# Patient Record
Sex: FEMALE | Race: WHITE | NOT HISPANIC OR LATINO | Employment: OTHER | URBAN - METROPOLITAN AREA
[De-identification: names, ages, dates, MRNs, and addresses within clinical notes are randomized per-mention and may not be internally consistent; named-entity substitution may affect disease eponyms.]

---

## 2017-10-23 ENCOUNTER — TELEPHONE (OUTPATIENT)
Dept: OPHTHALMOLOGY | Facility: CLINIC | Age: 72
End: 2017-10-23

## 2017-10-23 NOTE — TELEPHONE ENCOUNTER
----- Message from Dana Daugherty sent at 10/20/2017  2:30 PM CDT -----  Contact: Madie Hebert   Pt would like speak with  nurse to scheduled the appointment pt can be reached at [phone] or cell# [phone] please thank you.      10/23/17 at 8:14a  [place] returned call to pt regarding scheduling an appt with Dr. Calvin MD for eval of Grave's disease, diplopia and strabismus. She will call back to schedule due to other personal business she has to take care of first. melvina

## 2018-01-16 ENCOUNTER — OFFICE VISIT (OUTPATIENT)
Dept: OPHTHALMOLOGY | Facility: CLINIC | Age: 73
End: 2018-01-16
Payer: MEDICARE

## 2018-01-16 DIAGNOSIS — H50.21 HYPERTROPIA OF RIGHT EYE: ICD-10-CM

## 2018-01-16 DIAGNOSIS — E05.00 GRAVES' DISEASE: Primary | ICD-10-CM

## 2018-01-16 PROCEDURE — 92002 INTRM OPH EXAM NEW PATIENT: CPT | Mod: S$PBB,,, | Performed by: OPHTHALMOLOGY

## 2018-01-16 PROCEDURE — 92060 SENSORIMOTOR EXAMINATION: CPT | Mod: 26,S$PBB,, | Performed by: OPHTHALMOLOGY

## 2018-01-16 PROCEDURE — 92060 SENSORIMOTOR EXAMINATION: CPT | Mod: PBBFAC | Performed by: OPHTHALMOLOGY

## 2018-01-16 PROCEDURE — 99212 OFFICE O/P EST SF 10 MIN: CPT | Mod: PBBFAC | Performed by: OPHTHALMOLOGY

## 2018-01-16 PROCEDURE — 99999 PR PBB SHADOW E&M-EST. PATIENT-LVL II: CPT | Mod: PBBFAC,,, | Performed by: OPHTHALMOLOGY

## 2018-01-16 NOTE — PROGRESS NOTES
HPI     73 Y/O F presents today with original c/o side by side diplopia,but now   my vision is a torsion vision in my left eye. I do have a h/o of   Strabismus Repair by Dr. Jean Marie weaver 11/2017. Dx Tara dzstj      POHx:   1. S/P Strabismus Repair- 1. OS MR 4mm recession.  2. OS MR transposed   down 1/3 to 1/2 tendon width.     3. Explore OD MR    2.  V-Pattern  ET worse in down gaze, worse in left gaze.   3. Esotropia    Last edited by CHRISTOPHER Simpson Jr., MD on 1/16/2018  2:42 PM. (History)              Assessment /Plan     For exam results, see Encounter Report.    Graves' disease    Hypertropia of right eye    with excyclotorsion  With RH corrected, no diplopia  Rx 3 BD OD

## 2018-01-31 ENCOUNTER — PATIENT MESSAGE (OUTPATIENT)
Dept: OPHTHALMOLOGY | Facility: CLINIC | Age: 73
End: 2018-01-31

## 2018-03-19 PROBLEM — K21.9 GERD (GASTROESOPHAGEAL REFLUX DISEASE): Status: RESOLVED | Noted: 2018-03-19 | Resolved: 2018-03-19

## 2021-01-05 ENCOUNTER — TELEPHONE (OUTPATIENT)
Dept: FAMILY MEDICINE | Facility: CLINIC | Age: 76
End: 2021-01-05

## 2021-01-05 ENCOUNTER — APPOINTMENT (OUTPATIENT)
Dept: FAMILY MEDICINE | Facility: CLINIC | Age: 76
End: 2021-01-05
Payer: MEDICARE

## 2021-01-05 DIAGNOSIS — Z23 NEED FOR VACCINATION: ICD-10-CM

## 2021-01-05 PROCEDURE — 91300 COVID-19, MRNA, LNP-S, PF, 30 MCG/0.3 ML DOSE VACCINE: CPT | Mod: PBBFAC,PO

## 2021-01-26 ENCOUNTER — IMMUNIZATION (OUTPATIENT)
Dept: FAMILY MEDICINE | Facility: CLINIC | Age: 76
End: 2021-01-26
Payer: MEDICARE

## 2021-01-26 DIAGNOSIS — Z23 NEED FOR VACCINATION: Primary | ICD-10-CM

## 2021-01-26 PROCEDURE — 91300 COVID-19, MRNA, LNP-S, PF, 30 MCG/0.3 ML DOSE VACCINE: CPT | Mod: PBBFAC,PO

## 2021-01-26 PROCEDURE — 0002A COVID-19, MRNA, LNP-S, PF, 30 MCG/0.3 ML DOSE VACCINE: CPT | Mod: PBBFAC,PO

## 2022-03-11 PROBLEM — Z00.00 PREVENTATIVE HEALTH CARE: Status: ACTIVE | Noted: 2022-03-11

## 2022-03-11 PROBLEM — M25.561 CHRONIC PAIN OF RIGHT KNEE: Status: ACTIVE | Noted: 2022-03-11

## 2022-03-11 PROBLEM — Z00.00 PREVENTATIVE HEALTH CARE: Status: RESOLVED | Noted: 2022-03-11 | Resolved: 2022-03-11

## 2022-03-11 PROBLEM — M50.30 DEGENERATIVE DISC DISEASE, CERVICAL: Status: ACTIVE | Noted: 2022-03-11

## 2022-03-11 PROBLEM — G89.29 CHRONIC PAIN OF RIGHT KNEE: Status: ACTIVE | Noted: 2022-03-11

## 2022-03-11 PROBLEM — M48.02 CERVICAL SPINAL STENOSIS: Status: ACTIVE | Noted: 2022-03-11

## 2023-03-05 PROBLEM — Z86.010 HISTORY OF ADENOMATOUS POLYP OF COLON: Status: ACTIVE | Noted: 2023-03-05

## 2023-03-05 PROBLEM — Z86.0101 HISTORY OF ADENOMATOUS POLYP OF COLON: Status: ACTIVE | Noted: 2023-03-05

## 2024-05-20 PROBLEM — Z00.01 ABNORMAL WELLNESS EXAM: Status: ACTIVE | Noted: 2024-05-20

## 2024-08-19 PROBLEM — Z00.01 ABNORMAL WELLNESS EXAM: Status: RESOLVED | Noted: 2024-05-20 | Resolved: 2024-08-19

## 2025-03-28 ENCOUNTER — TELEPHONE (OUTPATIENT)
Dept: ELECTROPHYSIOLOGY | Facility: CLINIC | Age: 80
End: 2025-03-28
Payer: MEDICARE

## 2025-03-28 DIAGNOSIS — I48.0 PAROXYSMAL ATRIAL FIBRILLATION: Primary | ICD-10-CM

## 2025-03-28 DIAGNOSIS — I48.0 PAROXYSMAL ATRIAL FIBRILLATION: ICD-10-CM

## 2025-03-28 RX ORDER — METOPROLOL TARTRATE 25 MG/1
25 TABLET, FILM COATED ORAL 2 TIMES DAILY
Qty: 60 TABLET | Refills: 0 | Status: SHIPPED | OUTPATIENT
Start: 2025-03-28 | End: 2026-03-28

## 2025-03-28 RX ORDER — METOPROLOL TARTRATE 25 MG/1
25 TABLET, FILM COATED ORAL 2 TIMES DAILY
Qty: 180 TABLET | Refills: 3 | OUTPATIENT
Start: 2025-03-28

## 2025-03-28 RX ORDER — METOPROLOL TARTRATE 25 MG/1
25 TABLET, FILM COATED ORAL 2 TIMES DAILY
Qty: 180 TABLET | Refills: 3 | Status: SHIPPED | OUTPATIENT
Start: 2025-04-28 | End: 2026-04-28

## 2025-03-28 NOTE — TELEPHONE ENCOUNTER
Received a phone call from patient. 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 They are in [place], [place] and she had a 2 hour episode of atrial fibrillation with RVR. Confirmed on a [employer] device strip that I reviewed. She had a prior episode of AF years ago in setting of Grave's disease. She took a propranolol dose. Her [id number] score is 4 (age, woman, aortic atherosclerosis noted on an US). Recommend eliquis 5mg bid, metoprolol tartrate 25mg bid and clinic visit with me.

## 2025-05-05 DIAGNOSIS — I48.0 PAROXYSMAL ATRIAL FIBRILLATION: Primary | ICD-10-CM

## 2025-05-05 NOTE — PROGRESS NOTES
Subjective   Patient ID:  Madie Hebert is a 79 y.o. female who presents for evaluation of Atrial Fibrillation      HPI  I had the pleasure of seeing Mrs Hebert today in our electrophysiology clinic in consultation for her atrial fibrillation. As you are aware she is a pleasant 79 year-old woman with paroxysmal atrial fibrillation. She has a remote history of paroxysmal atrial fibrillation in 2019 when she had hyperthyroidism/Grave's disease. Recently she was on vacation in [place] when she had a 2 hour paroxysm. Her , also a patient of mine, messaged me and sent me a [employer]ard[employer] strip of her symptomatic arrhythmia, confirming AF. We started eliquis ([id number]) and metoprolol. She presents for discussion. With metoprolol she got symptomatic bradycardia into the 40s, even with cutting metoprolol in half and then into quarters. Reports intermittent palpitations.    My interpretation of today's in-clinic ECG is sinus rhythm with normal intervals.      Review of Systems   Constitutional: Negative for fever and malaise/fatigue.   HENT:  Negative for congestion and sore throat.    Eyes:  Negative for blurred vision and visual disturbance.   Cardiovascular:  Negative for chest pain, dyspnea on exertion, irregular heartbeat, near-syncope, palpitations and syncope.   Respiratory:  Negative for cough and shortness of breath.    Hematologic/Lymphatic: Negative for bleeding problem. Does not bruise/bleed easily.   Skin: Negative.    Musculoskeletal: Negative.    Gastrointestinal:  Negative for bloating, abdominal pain, hematochezia and melena.   Neurological:  Negative for focal weakness and weakness.   Psychiatric/Behavioral: Negative.            Objective     Physical Exam  Vitals reviewed.   Constitutional:       General: She is not in acute distress.     Appearance: She is well-developed. She is not diaphoretic.   HENT:      Head: Normocephalic and atraumatic.   Eyes:      General:         Right eye: No discharge.          Left eye: No discharge.      Conjunctiva/sclera: Conjunctivae normal.   Cardiovascular:      Rate and Rhythm: Normal rate and regular rhythm.      Heart sounds: No murmur heard.     No friction rub. No gallop.   Pulmonary:      Effort: Pulmonary effort is normal. No respiratory distress.      Breath sounds: Normal breath sounds. No wheezing or rales.   Abdominal:      General: Bowel sounds are normal. There is no distension.      Palpations: Abdomen is soft.      Tenderness: There is no abdominal tenderness.   Musculoskeletal:      Cervical back: Neck supple.   Skin:     General: Skin is warm and dry.   Neurological:      Mental Status: She is alert and oriented to person, place, and time.   Psychiatric:         Behavior: Behavior normal.         Thought Content: Thought content normal.         Judgment: Judgment normal.            Assessment and Plan     1. Paroxysmal atrial fibrillation        Plan:  In summary, Mrs Hebert is a pleasant 79 year-old woman with paroxysmal atrial fibrillation. I had a long discussion with the patient about the pathophysiology and risks of atrial fibrillation and its basic pathophysiology, including its health implications and treatment options. Specifically, I addressed the need for CVA (stroke) prophylaxis with aspirin versus oral anticoagulation (warfarin vs DOACs, discussed bleeding risks, and need to come to the ER for any head trauma for CT scanning even if asymptomatic). [id number] score is 3 and oral anticoagulation is indicated. I also discussed the goal to reduce symptomatic arrhythmic episodes by pharmacologic and/or procedural methods and utilizing a rhythm versus a rate control strategy.  Will get an extended holter and ECHO.      Thank you for allowing me to participate in the care of this patient. Please do not hesitate to call me with any questions or concerns.    Edil Bay MD, PhD  Cardiac Electrophysiology

## 2025-05-07 ENCOUNTER — OFFICE VISIT (OUTPATIENT)
Dept: ELECTROPHYSIOLOGY | Facility: CLINIC | Age: 80
End: 2025-05-07
Payer: MEDICARE

## 2025-05-07 ENCOUNTER — HOSPITAL ENCOUNTER (OUTPATIENT)
Dept: CARDIOLOGY | Facility: HOSPITAL | Age: 80
Discharge: HOME | End: 2025-05-07
Attending: INTERNAL MEDICINE
Payer: MEDICARE

## 2025-05-07 ENCOUNTER — HOSPITAL ENCOUNTER (OUTPATIENT)
Dept: CARDIOLOGY | Facility: CLINIC | Age: 80
Discharge: HOME | End: 2025-05-07
Payer: MEDICARE

## 2025-05-07 VITALS
WEIGHT: 117.5 LBS | HEIGHT: 63 IN | DIASTOLIC BLOOD PRESSURE: 85 MMHG | SYSTOLIC BLOOD PRESSURE: 165 MMHG | BODY MASS INDEX: 20.82 KG/M2 | HEART RATE: 62 BPM

## 2025-05-07 VITALS
DIASTOLIC BLOOD PRESSURE: 74 MMHG | SYSTOLIC BLOOD PRESSURE: 135 MMHG | HEART RATE: 70 BPM | WEIGHT: 117.5 LBS | BODY MASS INDEX: 20.82 KG/M2 | HEIGHT: 63 IN

## 2025-05-07 DIAGNOSIS — I48.0 PAROXYSMAL ATRIAL FIBRILLATION: ICD-10-CM

## 2025-05-07 DIAGNOSIS — I48.0 PAROXYSMAL ATRIAL FIBRILLATION: Primary | ICD-10-CM

## 2025-05-07 LAB
ASCENDING AORTA: 3.1 CM
AV AREA BY CONTINUOUS VTI: 2.3 CM2
AV INDEX (PROSTH): 0.83
AV LVOT MEAN GRADIENT: 3 MMHG
AV LVOT PEAK GRADIENT: 5 MMHG
AV MEAN GRADIENT: 3 MMHG
AV PEAK GRADIENT: 7 MMHG
AV REGURGITATION PRESSURE HALF TIME: 458 MS
AV VALVE AREA BY VELOCITY RATIO: 2.4 CM²
AV VALVE AREA: 2.4 CM2
AV VELOCITY RATIO: 0.85
BSA FOR ECHO PROCEDURE: 1.54 M2
CV ECHO LV RWT: 0.23 CM
DOP CALC AO PEAK VEL: 1.3 M/S
DOP CALC AO VTI: 32.4 CM
DOP CALC LVOT AREA: 2.8 CM2
DOP CALC LVOT DIAMETER: 1.9 CM
DOP CALC LVOT PEAK VEL: 1.1 M/S
DOP CALC LVOT STROKE VOLUME: 76.5 CM3
DOP CALCLVOT PEAK VEL VTI: 27 CM
E WAVE DECELERATION TIME: 291 MS
E/A RATIO: 0.71
E/E' RATIO: 7 M/S
ECHO EF ESTIMATED: 60 %
ECHO LV POSTERIOR WALL: 0.5 CM (ref 0.6–1.1)
EJECTION FRACTION: 58 %
FRACTIONAL SHORTENING: 31.8 % (ref 28–44)
INTERVENTRICULAR SEPTUM: 0.5 CM (ref 0.6–1.1)
IVC DIAMETER: 1.69 CM
LA MAJOR: 4.9 CM
LA MINOR: 4.9 CM
LA WIDTH: 3.3 CM
LEFT ATRIUM VOLUME INDEX MOD: 21 ML/M2
LEFT ATRIUM VOLUME MOD: 33 ML
LEFT INTERNAL DIMENSION IN SYSTOLE: 3 CM (ref 2.1–4)
LEFT VENTRICLE DIASTOLIC VOLUME INDEX: 55.19 ML/M2
LEFT VENTRICLE DIASTOLIC VOLUME: 85 ML
LEFT VENTRICLE MASS INDEX: 39.4 G/M2
LEFT VENTRICLE SYSTOLIC VOLUME INDEX: 22.1 ML/M2
LEFT VENTRICLE SYSTOLIC VOLUME: 34 ML
LEFT VENTRICULAR INTERNAL DIMENSION IN DIASTOLE: 4.4 CM (ref 3.5–6)
LEFT VENTRICULAR MASS: 60.7 G
LV LATERAL E/E' RATIO: 6.8
LV SEPTAL E/E' RATIO: 7.6
MV A" WAVE DURATION": 125.59 MS
MV PEAK A VEL: 0.96 M/S
MV PEAK E VEL: 0.68 M/S
OHS CV RV/LV RATIO: 1 CM
OHS QRS DURATION: 84 MS
OHS QTC CALCULATION: 419 MS
PISA TR MAX VEL: 2.2 M/S
PULM VEIN A" WAVE DURATION": 125.59 MS
PULM VEIN S/D RATIO: 1.81
PULMONIC VEIN PEAK A VELOCITY: 0.3 M/S
PV PEAK D VEL: 0.47 M/S
PV PEAK S VEL: 0.85 M/S
RA MAJOR: 4.38 CM
RA PRESSURE ESTIMATED: 3 MMHG
RA WIDTH: 4.34 CM
RIGHT ATRIAL AREA: 14.5 CM2
RIGHT VENTRICLE DIASTOLIC BASEL DIMENSION: 4.4 CM
RV TB RVSP: 5 MMHG
RV TISSUE DOPPLER FREE WALL SYSTOLIC VELOCITY 1 (APICAL 4 CHAMBER VIEW): 11.53 CM/S
SINUS: 3.12 CM
STJ: 3 CM
TDI LATERAL: 0.1 M/S
TDI SEPTAL: 0.09 M/S
TDI: 0.1 M/S
TRICUSPID ANNULAR PLANE SYSTOLIC EXCURSION: 2.8 CM
TV PEAK GRADIENT: 19 MMHG
TV REST PULMONARY ARTERY PRESSURE: 22 MMHG
Z-SCORE OF LEFT VENTRICULAR DIMENSION IN END DIASTOLE: -0.17
Z-SCORE OF LEFT VENTRICULAR DIMENSION IN END SYSTOLE: 0.61

## 2025-05-07 PROCEDURE — 99204 OFFICE O/P NEW MOD 45 MIN: CPT | Mod: S$PBB,,, | Performed by: INTERNAL MEDICINE

## 2025-05-07 PROCEDURE — 99999 PR PBB SHADOW E&M-EST. PATIENT-LVL III: CPT | Mod: PBBFAC,,, | Performed by: INTERNAL MEDICINE

## 2025-05-07 PROCEDURE — 93306 TTE W/DOPPLER COMPLETE: CPT | Mod: 26,,, | Performed by: INTERNAL MEDICINE

## 2025-05-07 PROCEDURE — 93010 ELECTROCARDIOGRAM REPORT: CPT | Mod: S$PBB,,, | Performed by: INTERNAL MEDICINE

## 2025-05-07 PROCEDURE — 93005 ELECTROCARDIOGRAM TRACING: CPT | Mod: PBBFAC | Performed by: INTERNAL MEDICINE

## 2025-05-07 PROCEDURE — 93306 TTE W/DOPPLER COMPLETE: CPT

## 2025-05-07 PROCEDURE — 99213 OFFICE O/P EST LOW 20 MIN: CPT | Mod: PBBFAC | Performed by: INTERNAL MEDICINE

## 2025-05-08 ENCOUNTER — CLINICAL SUPPORT (OUTPATIENT)
Dept: CARDIOLOGY | Facility: HOSPITAL | Age: 80
End: 2025-05-08
Attending: INTERNAL MEDICINE
Payer: MEDICARE

## 2025-05-08 DIAGNOSIS — I48.0 PAROXYSMAL ATRIAL FIBRILLATION: ICD-10-CM

## 2025-05-12 ENCOUNTER — PATIENT MESSAGE (OUTPATIENT)
Dept: ELECTROPHYSIOLOGY | Facility: CLINIC | Age: 80
End: 2025-05-12
Payer: MEDICARE

## 2025-05-21 DIAGNOSIS — I48.0 PAROXYSMAL ATRIAL FIBRILLATION: Primary | ICD-10-CM

## 2025-05-21 RX ORDER — APIXABAN 5 MG/1
5 TABLET, FILM COATED ORAL 2 TIMES DAILY
Qty: 60 TABLET | Refills: 11 | OUTPATIENT
Start: 2025-05-21